# Patient Record
Sex: FEMALE | Race: BLACK OR AFRICAN AMERICAN | NOT HISPANIC OR LATINO | Employment: FULL TIME | URBAN - METROPOLITAN AREA
[De-identification: names, ages, dates, MRNs, and addresses within clinical notes are randomized per-mention and may not be internally consistent; named-entity substitution may affect disease eponyms.]

---

## 2018-10-28 ENCOUNTER — OFFICE VISIT (OUTPATIENT)
Dept: URGENT CARE | Facility: CLINIC | Age: 68
End: 2018-10-28
Payer: MEDICARE

## 2018-10-28 VITALS
BODY MASS INDEX: 27.2 KG/M2 | DIASTOLIC BLOOD PRESSURE: 89 MMHG | RESPIRATION RATE: 12 BRPM | HEIGHT: 70 IN | HEART RATE: 82 BPM | OXYGEN SATURATION: 98 % | WEIGHT: 190 LBS | TEMPERATURE: 97 F | SYSTOLIC BLOOD PRESSURE: 148 MMHG

## 2018-10-28 DIAGNOSIS — Z11.3 SCREENING EXAMINATION FOR STD (SEXUALLY TRANSMITTED DISEASE): ICD-10-CM

## 2018-10-28 DIAGNOSIS — N94.9 GENITAL LESION, FEMALE: Primary | ICD-10-CM

## 2018-10-28 PROCEDURE — 99204 OFFICE O/P NEW MOD 45 MIN: CPT | Mod: S$GLB,,, | Performed by: NURSE PRACTITIONER

## 2018-10-28 PROCEDURE — 87529 HSV DNA AMP PROBE: CPT

## 2018-10-28 PROCEDURE — 99000 SPECIMEN HANDLING OFFICE-LAB: CPT | Mod: S$GLB,,, | Performed by: FAMILY MEDICINE

## 2018-10-28 RX ORDER — VALACYCLOVIR HYDROCHLORIDE 1 G/1
1000 TABLET, FILM COATED ORAL DAILY
Qty: 5 TABLET | Refills: 0 | Status: SHIPPED | OUTPATIENT
Start: 2018-10-28 | End: 2018-11-02

## 2018-10-28 RX ORDER — ACYCLOVIR 50 MG/G
CREAM TOPICAL
Qty: 5 G | Refills: 0 | Status: SHIPPED | OUTPATIENT
Start: 2018-10-28 | End: 2018-11-04

## 2018-10-28 NOTE — PATIENT INSTRUCTIONS
STD testing  You were tested for the following STDs on today:  HSV  As far the STD testing, we may hold off on any medications till the labs result. We will phone in medication for you if needed.  If we have decided to treat you now be sure to take all the meds as directed.  If you need more meds when the labs result we will call you and phone them in for you.  If you do test positive for STDs you should be retested in about 6 weeks again in 6 monts to ensure true negative results.    Increase condom use to prevent further occurance.  Notify sexual partners of the need for testing.  Complete ALL medication prescribed.  NO sexual intercourse for 7 days after treatment.      Today's testing will give no crediable information if you have unprotected sexual activities going forward.

## 2018-10-28 NOTE — PROGRESS NOTES
"Subjective:       Patient ID: Marquez Walsh is a 68 y.o. female.    Vitals:  height is 5' 10" (1.778 m) and weight is 86.2 kg (190 lb). Her tympanic temperature is 97.1 °F (36.2 °C). Her blood pressure is 148/89 (abnormal) and her pulse is 82. Her respiration is 12 and oxygen saturation is 98%.     Chief Complaint: Rash    Patient states she has a hx of HSV and she had an outbreak w/ itching that started yesterday.  She has put lidocaine cream around her bump to reduce the pain before she flew out.  She rates the rash at a level 8 pain.  She will be leaving [place] Thursday.      Female  Problem   The patient's primary symptoms include genital itching and a genital rash. The patient's pertinent negatives include no genital lesions, genital odor, missed menses, pelvic pain, vaginal bleeding or vaginal discharge. This is a recurrent problem. The current episode started yesterday. The problem occurs intermittently. The problem has been gradually worsening. The pain is severe. The problem affects the right side. She is not pregnant. Associated symptoms include rash. Pertinent negatives include no abdominal pain, anorexia, back pain, chills, constipation, diarrhea, discolored urine, dysuria, fever, flank pain, frequency, headaches, hematuria, joint pain, joint swelling, nausea, painful intercourse, sore throat, urgency or vomiting. Nothing aggravates the symptoms. She has tried nothing for the symptoms. She is not sexually active. She uses nothing for contraception. She is postmenopausal. Her past medical history is significant for herpes simplex.     Review of Systems   Constitution: Negative for chills and fever.   HENT: Negative for sore throat.    Respiratory: Negative for shortness of breath.    Skin: Positive for itching and rash.   Musculoskeletal: Negative for back pain and joint pain.   Gastrointestinal: Negative for abdominal pain, anorexia, constipation, diarrhea, nausea and vomiting. "   Genitourinary: Negative for dysuria, flank pain, frequency, genital sores, hematuria, missed menses, non-menstrual bleeding, pelvic pain, urgency and vaginal discharge.   Neurological: Negative for headaches.   All other systems reviewed and are negative.      Objective:      Physical Exam   Constitutional: She is oriented to person, place, and time. Vital signs are normal. She appears well-developed and well-nourished.  Non-toxic appearance. She does not have a sickly appearance. She does not appear ill. No distress.   HENT:   Head: Normocephalic and atraumatic.   Right Ear: Hearing, tympanic membrane, external ear and ear canal normal.   Left Ear: Hearing, tympanic membrane, external ear and ear canal normal.   Nose: Nose normal. No mucosal edema, rhinorrhea or nasal deformity. No epistaxis.   Mouth/Throat: Uvula is midline, oropharynx is clear and moist and mucous membranes are normal. Tonsils are 1+ on the right. Tonsils are 1+ on the left. No tonsillar exudate.   Eyes: Conjunctivae, EOM and lids are normal. Pupils are equal, round, and reactive to light.   Neck: Trachea normal, normal range of motion and phonation normal. Neck supple.   Cardiovascular: Normal rate, regular rhythm, S1 normal, S2 normal, normal heart sounds and normal pulses.   Pulmonary/Chest: Effort normal and breath sounds normal. She has no decreased breath sounds. She has no wheezes. She has no rhonchi. She has no rales.   Abdominal: Soft. Normal appearance and bowel sounds are normal. She exhibits no distension and no mass. There is no tenderness. There is no CVA tenderness.   Genitourinary:       Pelvic exam was performed with patient supine. No labial fusion. There is no rash, tenderness, lesion or injury on the right labia. There is no rash, tenderness, lesion or injury on the left labia.   Genitourinary Comments: Genital lesion noted to external vagina (clustered vesicular lesions) - see attached picture   Lymphadenopathy:     She has  no cervical adenopathy.   Neurological: She is alert and oriented to person, place, and time.   Skin: Skin is warm, dry and intact. Rash noted. Rash is vesicular.        Genital lesion noted to external vagina (clustered vesicular lesions) - see attached picture   Psychiatric: She has a normal mood and affect. Her speech is normal and behavior is normal. Cognition and memory are normal.   Nursing note and vitals reviewed.            Assessment:       1. Genital lesion, female    2. Screening examination for STD (sexually transmitted disease)        Plan:         Genital lesion, female  -     acyclovir 5% (ZOVIRAX) 5 % Crea; Apply topically 6 (six) times daily. for 7 days  Dispense: 5 g; Refill: 0  -     valACYclovir (VALTREX) 1000 MG tablet; Take 1 tablet (1,000 mg total) by mouth once daily. for 5 days  Dispense: 5 tablet; Refill: 0    Screening examination for STD (sexually transmitted disease)  -     HSV by Rapid PCR, Non-Blood [facility]; Vagina; Future; Expected date: 10/28/2018      Patient Instructions   STD testing  You were tested for the following STDs on today:  HSV  As far the STD testing, we may hold off on any medications till the labs result. We will phone in medication for you if needed.  If we have decided to treat you now be sure to take all the meds as directed.  If you need more meds when the labs result we will call you and phone them in for you.  If you do test positive for STDs you should be retested in about 6 weeks again in 6 monts to ensure true negative results.    Increase condom use to prevent further occurance.  Notify sexual partners of the need for testing.  Complete ALL medication prescribed.  NO sexual intercourse for 7 days after treatment.      Today's testing will give no crediable information if you have unprotected sexual activities going forward.

## 2018-10-30 LAB
HSV1 DNA SPEC QL NAA+PROBE: NEGATIVE
HSV2 DNA SPEC QL NAA+PROBE: POSITIVE
SPECIMEN SOURCE: ABNORMAL

## 2018-10-31 ENCOUNTER — TELEPHONE (OUTPATIENT)
Dept: URGENT CARE | Facility: CLINIC | Age: 68
End: 2018-10-31

## 2018-10-31 NOTE — TELEPHONE ENCOUNTER
Called pt regarding lab results but no answer. Left voicemail. Notify pt of positive hsv2 result and pt was treated for during visit.

## 2018-10-31 NOTE — TELEPHONE ENCOUNTER
----- Message from Vikki Sherman NP sent at 10/31/2018 10:33 AM CDT -----  Please call patient back with abnormal results.  Inform patient that she is positive for HSV2.   Please also ask how they are feeling and if she completed her medication therapy.  Thanks